# Patient Record
Sex: MALE | Race: WHITE
[De-identification: names, ages, dates, MRNs, and addresses within clinical notes are randomized per-mention and may not be internally consistent; named-entity substitution may affect disease eponyms.]

---

## 2021-03-04 ENCOUNTER — HOSPITAL ENCOUNTER (EMERGENCY)
Dept: HOSPITAL 82 - ED | Age: 66
Discharge: ANOTHER HOSPITAL | DRG: 311 | End: 2021-03-04
Payer: OTHER GOVERNMENT

## 2021-03-04 VITALS — SYSTOLIC BLOOD PRESSURE: 136 MMHG | DIASTOLIC BLOOD PRESSURE: 82 MMHG

## 2021-03-04 VITALS — HEIGHT: 72 IN | WEIGHT: 216.05 LBS | BODY MASS INDEX: 29.26 KG/M2

## 2021-03-04 DIAGNOSIS — I20.0: Primary | ICD-10-CM

## 2021-03-04 DIAGNOSIS — F17.210: ICD-10-CM

## 2021-03-04 DIAGNOSIS — M79.7: ICD-10-CM

## 2021-03-04 DIAGNOSIS — R79.89: ICD-10-CM

## 2021-03-04 DIAGNOSIS — I10: ICD-10-CM

## 2021-03-04 DIAGNOSIS — E11.9: ICD-10-CM

## 2021-03-04 LAB
ALBUMIN SERPL-MCNC: 4.4 G/DL (ref 3.2–5)
ALP SERPL-CCNC: 96 U/L (ref 38–126)
ANION GAP SERPL CALCULATED.3IONS-SCNC: 16 MMOL/L
APTT PPP: 26.2 SECONDS (ref 20–32.5)
AST SERPL-CCNC: 32 U/L (ref 19–48)
BASOPHILS NFR BLD AUTO: 0 % (ref 0–3)
BUN SERPL-MCNC: 15 MG/DL (ref 8–23)
BUN/CREAT SERPL: 13
CHLORIDE SERPL-SCNC: 102 MMOL/L (ref 95–108)
CO2 SERPL-SCNC: 24 MMOL/L (ref 22–30)
CREAT SERPL-MCNC: 1.2 MG/DL (ref 0.7–1.3)
EOSINOPHIL NFR BLD AUTO: 1 % (ref 0–8)
ERYTHROCYTE [DISTWIDTH] IN BLOOD BY AUTOMATED COUNT: 12.9 % (ref 11.5–15.5)
HCT VFR BLD AUTO: 51.8 % (ref 39–50)
HGB BLD-MCNC: 17.4 G/DL (ref 14–18)
IMM GRANULOCYTES NFR BLD: 0.4 % (ref 0–5)
INR PPP: 1 RATIO (ref 0.7–1.3)
LYMPHOCYTES NFR BLD: 32 % (ref 15–41)
MCH RBC QN AUTO: 31 PG  CALC (ref 26–32)
MCHC RBC AUTO-ENTMCNC: 33.6 G/DL CAL (ref 32–36)
MCV RBC AUTO: 92.2 FL  CALC (ref 80–100)
MONOCYTES NFR BLD AUTO: 8 % (ref 2–13)
MYOGLOBIN SERPL-MCNC: 228 NG/ML (ref 0–121)
NEUTROPHILS # BLD AUTO: 7.9 THOU/UL (ref 1.82–7.42)
NEUTROPHILS NFR BLD AUTO: 58 % (ref 42–76)
PLATELET # BLD AUTO: 318 THOU/UL (ref 130–400)
POTASSIUM SERPL-SCNC: 4.7 MMOL/L (ref 3.5–5.1)
PROT SERPL-MCNC: 7.9 G/DL (ref 6.3–8.2)
PROTHROMBIN TIME: 9.9 SECONDS (ref 9–12.5)
RBC # BLD AUTO: 5.62 MILL/UL (ref 4.7–6.1)
SODIUM SERPL-SCNC: 137 MMOL/L (ref 137–146)

## 2022-06-29 ENCOUNTER — HOSPITAL ENCOUNTER (OUTPATIENT)
Dept: HOSPITAL 82 - ED | Age: 67
Setting detail: OBSERVATION
LOS: 2 days | Discharge: HOME | DRG: 641 | End: 2022-07-01
Attending: INTERNAL MEDICINE | Admitting: INTERNAL MEDICINE
Payer: OTHER GOVERNMENT

## 2022-06-29 VITALS — DIASTOLIC BLOOD PRESSURE: 94 MMHG | SYSTOLIC BLOOD PRESSURE: 132 MMHG

## 2022-06-29 VITALS — DIASTOLIC BLOOD PRESSURE: 68 MMHG | SYSTOLIC BLOOD PRESSURE: 115 MMHG

## 2022-06-29 VITALS — WEIGHT: 209.44 LBS | HEIGHT: 72 IN | BODY MASS INDEX: 28.37 KG/M2

## 2022-06-29 DIAGNOSIS — H81.10: ICD-10-CM

## 2022-06-29 DIAGNOSIS — M79.7: ICD-10-CM

## 2022-06-29 DIAGNOSIS — Z79.899: ICD-10-CM

## 2022-06-29 DIAGNOSIS — E86.0: Primary | ICD-10-CM

## 2022-06-29 DIAGNOSIS — F17.200: ICD-10-CM

## 2022-06-29 DIAGNOSIS — Z95.1: ICD-10-CM

## 2022-06-29 DIAGNOSIS — Z20.822: ICD-10-CM

## 2022-06-29 DIAGNOSIS — Z79.84: ICD-10-CM

## 2022-06-29 DIAGNOSIS — I10: ICD-10-CM

## 2022-06-29 DIAGNOSIS — D72.829: ICD-10-CM

## 2022-06-29 DIAGNOSIS — Z79.4: ICD-10-CM

## 2022-06-29 DIAGNOSIS — I25.2: ICD-10-CM

## 2022-06-29 DIAGNOSIS — E11.9: ICD-10-CM

## 2022-06-29 LAB
ALBUMIN SERPL-MCNC: 4.6 G/DL (ref 3.2–5)
ALP SERPL-CCNC: 110 U/L (ref 38–126)
ANION GAP SERPL CALCULATED.3IONS-SCNC: 16 MMOL/L
AST SERPL-CCNC: 26 U/L (ref 19–48)
BASOPHILS NFR BLD AUTO: 0 % (ref 0–3)
BILIRUB UR QL STRIP.AUTO: NEGATIVE
BUN SERPL-MCNC: 14 MG/DL (ref 8–23)
BUN/CREAT SERPL: 11
CHLORIDE SERPL-SCNC: 101 MMOL/L (ref 95–108)
CO2 SERPL-SCNC: 23 MMOL/L (ref 22–30)
COLOR UR AUTO: YELLOW
CREAT SERPL-MCNC: 1.3 MG/DL (ref 0.7–1.3)
EOSINOPHIL NFR BLD AUTO: 0 % (ref 0–8)
ERYTHROCYTE [DISTWIDTH] IN BLOOD BY AUTOMATED COUNT: 13 % (ref 11.5–15.5)
GLUCOSE UR STRIP.AUTO-MCNC: >=1000 MG/DL
HCT VFR BLD AUTO: 50.2 % (ref 39–50)
HGB BLD-MCNC: 17 G/DL (ref 14–18)
HGB UR QL STRIP.AUTO: NEGATIVE
IMM GRANULOCYTES NFR BLD: 0.2 % (ref 0–5)
KETONES UR STRIP.AUTO-MCNC: NEGATIVE MG/DL
LEUKOCYTE ESTERASE UR QL STRIP.AUTO: NEGATIVE
LYMPHOCYTES NFR BLD: 20 % (ref 15–41)
MCH RBC QN AUTO: 32.1 PG  CALC (ref 26–32)
MCHC RBC AUTO-ENTMCNC: 33.9 G/DL CAL (ref 32–36)
MCV RBC AUTO: 94.9 FL  CALC (ref 80–100)
MONOCYTES NFR BLD AUTO: 5 % (ref 2–13)
NEUTROPHILS # BLD AUTO: 12.8 THOU/UL (ref 1.82–7.42)
NEUTROPHILS NFR BLD AUTO: 74 % (ref 42–76)
NITRITE UR QL STRIP.AUTO: NEGATIVE
PH UR STRIP.AUTO: 5.5 [PH] (ref 4.5–8)
PLATELET # BLD AUTO: 377 THOU/UL (ref 130–400)
POTASSIUM SERPL-SCNC: 3.9 MMOL/L (ref 3.5–5.1)
PROT SERPL-MCNC: 8.6 G/DL (ref 6.3–8.2)
PROT UR QL STRIP.AUTO: NEGATIVE MG/DL
RBC # BLD AUTO: 5.29 MILL/UL (ref 4.7–6.1)
SODIUM SERPL-SCNC: 136 MMOL/L (ref 137–146)
SP GR UR STRIP.AUTO: 1.01
UROBILINOGEN UR QL STRIP.AUTO: 0.2 E.U./DL

## 2022-06-29 NOTE — NUR
ANTIBIOTIC HUNG AT THIS TIME. PATIENT DENIES ANY CURRENT NEEDS. CALL LIGHT AND
BEDSIDE TABLE WIHTIN REACH.

## 2022-06-29 NOTE — NUR
PT ARRIVED TO [facility] @1910 VIA WHEELCHAIR, ACCOMPANIED BY LUIS JOSEPH. PT ORIENTED
TO ROOM AND USE OF CALL LIGHT. SAFETY PRECAUTIONS IN PLACE WITH CALL LIGHT IN
REACH.

## 2022-06-29 NOTE — NUR
PATIENT ASSESMENT COMPLETED AT THIS TIME. PATIENT ALERT AND ORIENTED X3.
NORMAL HEART SOUNDS, CLEAR LUNG SOUNDS BILATERALLY. TELE IN PLACE. #18 IN RFA
FLUSHED AND PATENT. ORIENTED TO CALL LIGHT SYSTEM, CALL LIGHT AND BEDSIDE
TABLE WITHIN REACH.

## 2022-06-30 VITALS — DIASTOLIC BLOOD PRESSURE: 84 MMHG | SYSTOLIC BLOOD PRESSURE: 150 MMHG

## 2022-06-30 VITALS — SYSTOLIC BLOOD PRESSURE: 150 MMHG | DIASTOLIC BLOOD PRESSURE: 84 MMHG

## 2022-06-30 VITALS — DIASTOLIC BLOOD PRESSURE: 80 MMHG | SYSTOLIC BLOOD PRESSURE: 134 MMHG

## 2022-06-30 VITALS — DIASTOLIC BLOOD PRESSURE: 82 MMHG | SYSTOLIC BLOOD PRESSURE: 121 MMHG

## 2022-06-30 VITALS — DIASTOLIC BLOOD PRESSURE: 79 MMHG | SYSTOLIC BLOOD PRESSURE: 141 MMHG

## 2022-06-30 VITALS — DIASTOLIC BLOOD PRESSURE: 89 MMHG | SYSTOLIC BLOOD PRESSURE: 134 MMHG

## 2022-06-30 VITALS — SYSTOLIC BLOOD PRESSURE: 138 MMHG | DIASTOLIC BLOOD PRESSURE: 86 MMHG

## 2022-06-30 VITALS — DIASTOLIC BLOOD PRESSURE: 80 MMHG | SYSTOLIC BLOOD PRESSURE: 128 MMHG

## 2022-06-30 VITALS — DIASTOLIC BLOOD PRESSURE: 68 MMHG | SYSTOLIC BLOOD PRESSURE: 115 MMHG

## 2022-06-30 VITALS — SYSTOLIC BLOOD PRESSURE: 128 MMHG | DIASTOLIC BLOOD PRESSURE: 80 MMHG

## 2022-06-30 VITALS — DIASTOLIC BLOOD PRESSURE: 76 MMHG | SYSTOLIC BLOOD PRESSURE: 139 MMHG

## 2022-06-30 LAB
ANION GAP SERPL CALCULATED.3IONS-SCNC: 11 MMOL/L
BUN SERPL-MCNC: 19 MG/DL (ref 8–23)
BUN/CREAT SERPL: 17
CHLORIDE SERPL-SCNC: 105 MMOL/L (ref 95–108)
CO2 SERPL-SCNC: 25 MMOL/L (ref 22–30)
CREAT SERPL-MCNC: 1.1 MG/DL (ref 0.7–1.3)
ERYTHROCYTE [DISTWIDTH] IN BLOOD BY AUTOMATED COUNT: 13 % (ref 11.5–15.5)
HCT VFR BLD AUTO: 46 % (ref 39–50)
HGB BLD-MCNC: 15.6 G/DL (ref 14–18)
MAGNESIUM SERPL-MCNC: 1.6 MG/DL (ref 1.6–2.3)
MCH RBC QN AUTO: 32.5 PG  CALC (ref 26–32)
MCHC RBC AUTO-ENTMCNC: 33.9 G/DL CAL (ref 32–36)
MCV RBC AUTO: 95.8 FL  CALC (ref 80–100)
PLATELET # BLD AUTO: 322 THOU/UL (ref 130–400)
POTASSIUM SERPL-SCNC: 3.6 MMOL/L (ref 3.5–5.1)
RBC # BLD AUTO: 4.8 MILL/UL (ref 4.7–6.1)
SODIUM SERPL-SCNC: 137 MMOL/L (ref 137–146)

## 2022-06-30 NOTE — NUR
PT SITTING ON HIGH RODRIGUEZ'S, HAVING LUNCH. NO DISTRESS OR PAIN NOTED. NO NEEDS
AT THE TIME. SAFETY PRECAUTIONS IN PLACE WITH CALL LIGHT IN REACH.

## 2022-06-30 NOTE — NUR
ASSEMENT COMPLETED AT THIS TIME. PATIENT ALERT AND ORIENTED. NORMAL HEART
SOUNDS, CLEAR LUNG SOUNDS THROUGHOUT. TELE IN PLACE READING SR 78. PATIENT
DENIES ANY PAIN OR ANY CURRENT NEEDS AT THIS TIME. #20 IN [facility] INFUSING NS AT
100 PER EMAR. CALL LIGHT AND BEDSIDE TABLE WITHIN REACH.

## 2022-06-30 NOTE — NUR
PT IN BED WATCHING TV. NO DISTRESS NOTED. PT DENIES PAIN AT THE MOMENT. NO
NEEDS AT THE TIME. SAFETY PRECAUTIONS IN PLACE WITH CALL LIGHT IN REACH.

## 2022-06-30 NOTE — NUR
ANTIBIOTIC HUNG AT THIS TIME, PATIENT DENIES ANY NEEDS AT THIS TIME. CALL
LIGHT AND BEDSIDE TABLE WITHIN REACH.

## 2022-06-30 NOTE — NUR
PT IN BED;A&O X3. TELEMETRY IN PLACE WITH LAST READING SR-80. EVEN AND
UNLABORED RESPIRATIONS; CLEAR LUNG SOUNDS UPON AUSCULTATION. ACTIVE BOWEL
SOUNDS X4 QUADRANTS. IV SITE HEALTHY AND PATENT. NO DISTRESS NOTED. PT DENIES
PAIN AT THE MOMENT. PT INFORMED HE WILL BE GOING TO RADIOLOGY FOR CHEST XRAY;
PT AGREES AND SHOWED UNDERSTANDING. SAFETY PRECAUTIONS IN PLACE WITH CALL
LIGHT IN REACH.

## 2022-06-30 NOTE — NUR
ANTIBIOTIC HUNG AT THIS TIME. PATIENT DENIES ANY FURTHER NEEDS, CALL LIGHT AND
BEDSIDE TABLE WITHIN REACH.

## 2022-07-01 VITALS — DIASTOLIC BLOOD PRESSURE: 88 MMHG | SYSTOLIC BLOOD PRESSURE: 145 MMHG

## 2022-07-01 VITALS — DIASTOLIC BLOOD PRESSURE: 85 MMHG | SYSTOLIC BLOOD PRESSURE: 160 MMHG

## 2022-07-01 VITALS — SYSTOLIC BLOOD PRESSURE: 140 MMHG | DIASTOLIC BLOOD PRESSURE: 93 MMHG

## 2022-07-01 VITALS — SYSTOLIC BLOOD PRESSURE: 145 MMHG | DIASTOLIC BLOOD PRESSURE: 85 MMHG

## 2022-07-01 VITALS — SYSTOLIC BLOOD PRESSURE: 160 MMHG | DIASTOLIC BLOOD PRESSURE: 85 MMHG

## 2022-07-01 LAB
ALBUMIN SERPL-MCNC: 3.3 G/DL (ref 3.2–5)
ALP SERPL-CCNC: 75 U/L (ref 38–126)
ANION GAP SERPL CALCULATED.3IONS-SCNC: 9 MMOL/L
AST SERPL-CCNC: 21 U/L (ref 19–48)
BASOPHILS NFR BLD AUTO: 0 % (ref 0–3)
BUN SERPL-MCNC: 18 MG/DL (ref 8–23)
BUN/CREAT SERPL: 18
CHLORIDE SERPL-SCNC: 110 MMOL/L (ref 95–108)
CO2 SERPL-SCNC: 22 MMOL/L (ref 22–30)
CREAT SERPL-MCNC: 1 MG/DL (ref 0.7–1.3)
EOSINOPHIL NFR BLD AUTO: 2 % (ref 0–8)
ERYTHROCYTE [DISTWIDTH] IN BLOOD BY AUTOMATED COUNT: 13 % (ref 11.5–15.5)
HCT VFR BLD AUTO: 43.8 % (ref 39–50)
HGB BLD-MCNC: 14.8 G/DL (ref 14–18)
IMM GRANULOCYTES NFR BLD: 0.2 % (ref 0–5)
LYMPHOCYTES NFR BLD: 36 % (ref 15–41)
MAGNESIUM SERPL-MCNC: 1.8 MG/DL (ref 1.6–2.3)
MCH RBC QN AUTO: 32.5 PG  CALC (ref 26–32)
MCHC RBC AUTO-ENTMCNC: 33.8 G/DL CAL (ref 32–36)
MCV RBC AUTO: 96.3 FL  CALC (ref 80–100)
MONOCYTES NFR BLD AUTO: 7 % (ref 2–13)
NEUTROPHILS # BLD AUTO: 5.71 THOU/UL (ref 1.82–7.42)
NEUTROPHILS NFR BLD AUTO: 55 % (ref 42–76)
PLATELET # BLD AUTO: 296 THOU/UL (ref 130–400)
POTASSIUM SERPL-SCNC: 3.7 MMOL/L (ref 3.5–5.1)
PROT SERPL-MCNC: 6.2 G/DL (ref 6.3–8.2)
RBC # BLD AUTO: 4.55 MILL/UL (ref 4.7–6.1)
SODIUM SERPL-SCNC: 138 MMOL/L (ref 137–146)

## 2022-07-01 NOTE — NUR
PATIENT RESTING COMFORTABLY, DENIES AY CURRENT NEEDS AT THIS TIME 150 ML
OF CLEAR YELLOW URINE EMPTIED FROM URINAL AT THIS TIME. ANTIBIOTIC HUNG, CALL
LIGHT AND BEDSIDE TABLE WITHIN REACH.

## 2022-07-01 NOTE — NUR
PT RESTING IN FOWLERS POSITION. PT REQUESTED DRINK. DRINK PROVIDED. PT IV SITE
PATENT FLUSHED. NOW S.L. PT DENIES ADDITIONAL NEEDS ALL SAFETY PRECAUTIONS IN
PLACE.

## 2022-07-01 NOTE — NUR
PT RESTING IN FOWLERS POSITION. A/OX3 ASSESSMENT AND VS COMPLETED. HEART
RHYTHM NORMAL ON TELE . RESPIRATIONS EVEN UNLABORED. BOWEL SOUNDS ACTIVE.IV
SITE NOTED TO LEFT LOWER ARM. INFUSING WITH NS. PT DENIES ADDITIONAL NEEDSA T
THE TIME ALL SAFETY PRECAUTIONS IN PLACE WITH CALL LIGHT IN REACH.

## 2022-07-01 NOTE — NUR
Discharge instructions given. Patient verbalizes understanding of same.
Discharged in stable condition via Wheelchair to Home with
staff. All belongings sent with pt. PT TELE OFF AND IV OUT .